# Patient Record
Sex: MALE | Race: BLACK OR AFRICAN AMERICAN | NOT HISPANIC OR LATINO | URBAN - METROPOLITAN AREA
[De-identification: names, ages, dates, MRNs, and addresses within clinical notes are randomized per-mention and may not be internally consistent; named-entity substitution may affect disease eponyms.]

---

## 2020-12-09 ENCOUNTER — OFFICE VISIT (OUTPATIENT)
Dept: URBAN - METROPOLITAN AREA CLINIC 25 | Facility: CLINIC | Age: 20
End: 2020-12-09

## 2022-11-21 ENCOUNTER — APPOINTMENT (RX ONLY)
Dept: URBAN - METROPOLITAN AREA CLINIC 12 | Facility: CLINIC | Age: 22
Setting detail: DERMATOLOGY
End: 2022-11-21

## 2022-11-21 DIAGNOSIS — L91.0 HYPERTROPHIC SCAR: ICD-10-CM

## 2022-11-21 PROCEDURE — ? COUNSELING - KELOID

## 2022-11-21 PROCEDURE — ? PATIENT SPECIFIC COUNSELING

## 2022-11-21 PROCEDURE — ? OTHER (COSMETIC)

## 2022-11-21 ASSESSMENT — LOCATION DETAILED DESCRIPTION DERM: LOCATION DETAILED: STERNUM

## 2022-11-21 ASSESSMENT — LOCATION ZONE DERM: LOCATION ZONE: TRUNK

## 2022-11-21 ASSESSMENT — LOCATION SIMPLE DESCRIPTION DERM: LOCATION SIMPLE: CHEST

## 2022-11-21 NOTE — PROCEDURE: PATIENT SPECIFIC COUNSELING
Other (Free Text): Patient presents to clinic today with his  for evaluation of keloid located on sternum. Patient states that the keloid has been growing in the past 3 years due to previous injury from playing soccer. \\n\\Randy Pitt examined the keloid and discussed patient’s case with Dr. Dusty Zambrano. Dr. Pitt was informed that the patient was seen in July. Dr. Zambrano confirmed that if surgery is performed by , Dr. Zambrano will proceed with radiation post surgery to ensure that the keloid does not return. Patient was advised that the location of the keloid is a high risk area making the chances of the keloid returning much higher. Dr. Pitt voiced that surgery should be the last option for the patient. Advised patient that he should consider getting treated with Kenalog injection atleast once a month for 3-6 months to make the keloid softer and less itchy. Dr. Pitt advised that it is important to ensure that there is social support prior to proceeding with the following treatment plan. Patient verbalized agreement and understanding. Patient  was also made aware of the treatment plan and voiced that they would like to review the quotes prior to proceeding with Kenalog injection treatment.
Detail Level: Simple

## 2023-01-13 ENCOUNTER — APPOINTMENT (RX ONLY)
Dept: URBAN - METROPOLITAN AREA CLINIC 12 | Facility: CLINIC | Age: 23
Setting detail: DERMATOLOGY
End: 2023-01-13

## 2023-01-13 DIAGNOSIS — L91.0 HYPERTROPHIC SCAR: ICD-10-CM

## 2023-01-13 PROCEDURE — ? PATIENT SPECIFIC COUNSELING

## 2023-01-13 PROCEDURE — ? INTRALESIONAL KENALOG (COSMETIC)

## 2023-01-13 PROCEDURE — ? COUNSELING - KELOID

## 2023-01-13 ASSESSMENT — LOCATION ZONE DERM
LOCATION ZONE: ARM
LOCATION ZONE: TRUNK
LOCATION ZONE: ARM
LOCATION ZONE: TRUNK

## 2023-01-13 ASSESSMENT — LOCATION DETAILED DESCRIPTION DERM
LOCATION DETAILED: LEFT LATERAL ABDOMEN
LOCATION DETAILED: STERNUM
LOCATION DETAILED: RIGHT PROXIMAL DORSAL FOREARM
LOCATION DETAILED: LEFT LATERAL ABDOMEN
LOCATION DETAILED: RIGHT DISTAL DORSAL FOREARM
LOCATION DETAILED: RIGHT MEDIAL SUPERIOR CHEST
LOCATION DETAILED: STERNUM
LOCATION DETAILED: RIGHT PROXIMAL DORSAL FOREARM

## 2023-01-13 ASSESSMENT — LOCATION SIMPLE DESCRIPTION DERM
LOCATION SIMPLE: CHEST
LOCATION SIMPLE: ABDOMEN
LOCATION SIMPLE: CHEST
LOCATION SIMPLE: ABDOMEN
LOCATION SIMPLE: RIGHT FOREARM
LOCATION SIMPLE: RIGHT FOREARM

## 2023-01-13 NOTE — PROCEDURE: PATIENT SPECIFIC COUNSELING
Other (Free Text): Patient presents for steroid treatment on his chest abdomen and back. Dr. Pitt discussed surgery and treated the keloids on his  abdomen and arm. Patient was treated with Kenalog 40 1:1 5FU using the derma jet. Patient did not tolerate the derma-jet too well. Estrellita stepped in to discuss his cash quote for the keloid removal on his chest.
Detail Level: Simple

## 2023-01-13 NOTE — PROCEDURE: INTRALESIONAL KENALOG (COSMETIC)
Kenalog Preparation: kenalog with 5-fluorouracil
Consent: The risks of atrophy were reviewed with the patient.
Price (Use Numbers Only, No Special Characters Or $): 395.00
Concentration (Mg/Ml): 20
Total Volume (Ccs): 4
Expiration Date (Optional): 2/2023
Detail Level: Detailed
Lot # (Optional): [id number]

## 2023-02-13 ENCOUNTER — APPOINTMENT (RX ONLY)
Dept: URBAN - METROPOLITAN AREA CLINIC 12 | Facility: CLINIC | Age: 23
Setting detail: DERMATOLOGY
End: 2023-02-13

## 2023-02-13 DIAGNOSIS — L91.0 HYPERTROPHIC SCAR: ICD-10-CM

## 2023-02-13 PROCEDURE — ? COUNSELING - KELOID

## 2023-02-13 PROCEDURE — ? INTRALESIONAL KENALOG (COSMETIC)

## 2023-02-13 PROCEDURE — ? PATIENT SPECIFIC COUNSELING

## 2023-02-13 ASSESSMENT — LOCATION DETAILED DESCRIPTION DERM
LOCATION DETAILED: LEFT LATERAL ABDOMEN
LOCATION DETAILED: RIGHT DISTAL DORSAL FOREARM
LOCATION DETAILED: RIGHT PROXIMAL DORSAL FOREARM
LOCATION DETAILED: RIGHT MEDIAL SUPERIOR CHEST
LOCATION DETAILED: LEFT LATERAL ABDOMEN
LOCATION DETAILED: STERNUM
LOCATION DETAILED: STERNUM
LOCATION DETAILED: RIGHT PROXIMAL DORSAL FOREARM

## 2023-02-13 ASSESSMENT — LOCATION SIMPLE DESCRIPTION DERM
LOCATION SIMPLE: ABDOMEN
LOCATION SIMPLE: RIGHT FOREARM
LOCATION SIMPLE: ABDOMEN
LOCATION SIMPLE: RIGHT FOREARM
LOCATION SIMPLE: CHEST
LOCATION SIMPLE: CHEST

## 2023-02-13 ASSESSMENT — LOCATION ZONE DERM
LOCATION ZONE: ARM
LOCATION ZONE: TRUNK
LOCATION ZONE: ARM
LOCATION ZONE: TRUNK

## 2023-02-13 NOTE — PROCEDURE: INTRALESIONAL KENALOG (COSMETIC)
Kenalog Preparation: kenalog with 5-fluorouracil
Consent: The risks of atrophy were reviewed with the patient.
Price (Use Numbers Only, No Special Characters Or $): 395.00
Concentration (Mg/Ml): 20
Total Volume (Ccs): 4
Expiration Date (Optional): 2/2023
Detail Level: Detailed
Lot # (Optional): [id number]

## 2023-03-13 ENCOUNTER — APPOINTMENT (RX ONLY)
Dept: URBAN - METROPOLITAN AREA CLINIC 12 | Facility: CLINIC | Age: 23
Setting detail: DERMATOLOGY
End: 2023-03-13

## 2023-03-13 DIAGNOSIS — L91.0 HYPERTROPHIC SCAR: ICD-10-CM

## 2023-03-13 PROCEDURE — ? COUNSELING - KELOID

## 2023-03-13 PROCEDURE — ? PATIENT SPECIFIC COUNSELING

## 2023-03-13 PROCEDURE — ? INTRALESIONAL KENALOG (COSMETIC)

## 2023-03-13 ASSESSMENT — LOCATION DETAILED DESCRIPTION DERM
LOCATION DETAILED: RIGHT DISTAL DORSAL FOREARM
LOCATION DETAILED: RIGHT MEDIAL SUPERIOR CHEST
LOCATION DETAILED: LEFT LATERAL ABDOMEN
LOCATION DETAILED: STERNUM
LOCATION DETAILED: RIGHT PROXIMAL DORSAL FOREARM
LOCATION DETAILED: RIGHT PROXIMAL DORSAL FOREARM
LOCATION DETAILED: LEFT LATERAL ABDOMEN
LOCATION DETAILED: STERNUM

## 2023-03-13 ASSESSMENT — LOCATION SIMPLE DESCRIPTION DERM
LOCATION SIMPLE: ABDOMEN
LOCATION SIMPLE: RIGHT FOREARM
LOCATION SIMPLE: CHEST
LOCATION SIMPLE: RIGHT FOREARM
LOCATION SIMPLE: CHEST
LOCATION SIMPLE: ABDOMEN

## 2023-03-13 ASSESSMENT — LOCATION ZONE DERM
LOCATION ZONE: ARM
LOCATION ZONE: TRUNK
LOCATION ZONE: TRUNK
LOCATION ZONE: ARM

## 2023-03-13 NOTE — PROCEDURE: PATIENT SPECIFIC COUNSELING
Other (Free Text): Patient presents for f/u steroid treatment on his chest abdomen and back. He reports still having some mild pain, irritation and discomfort. He voices no other concerns at the time of his visit. \\n\\Randy Pitt examined the patient and voiced doing another Kenalog injection on his abdomen and arm. Patient was treated with Kenalog 40 1:1 5FU patient tolerated very well. Patient and  were also informed that we are still waiting for insurance approval for his surgery will keep in contact patient verbalized understanding. Return in one month.
Detail Level: Simple

## 2023-03-13 NOTE — PROCEDURE: INTRALESIONAL KENALOG (COSMETIC)
Kenalog Preparation: kenalog with 5-fluorouracil
Consent: The risks of atrophy were reviewed with the patient.
Price (Use Numbers Only, No Special Characters Or $): 395.00
Concentration (Mg/Ml): 20
Administered By (Optional): dr. Pitt
Total Volume (Ccs): 4
Detail Level: Detailed
Total Volume Injected (Ccs): 1

## 2023-04-21 ENCOUNTER — APPOINTMENT (RX ONLY)
Dept: URBAN - METROPOLITAN AREA CLINIC 12 | Facility: CLINIC | Age: 23
Setting detail: DERMATOLOGY
End: 2023-04-21

## 2023-04-21 DIAGNOSIS — L91.0 HYPERTROPHIC SCAR: ICD-10-CM

## 2023-04-21 PROCEDURE — 99212 OFFICE O/P EST SF 10 MIN: CPT

## 2023-04-21 PROCEDURE — ? PRE-OP WORKLIST

## 2023-04-21 PROCEDURE — ? COUNSELING - KELOID

## 2023-04-21 ASSESSMENT — LOCATION SIMPLE DESCRIPTION DERM
LOCATION SIMPLE: RIGHT FOREARM
LOCATION SIMPLE: CHEST
LOCATION SIMPLE: CHEST
LOCATION SIMPLE: ABDOMEN

## 2023-04-21 ASSESSMENT — LOCATION DETAILED DESCRIPTION DERM
LOCATION DETAILED: STERNUM
LOCATION DETAILED: LEFT LATERAL ABDOMEN
LOCATION DETAILED: RIGHT PROXIMAL DORSAL FOREARM
LOCATION DETAILED: LEFT MEDIAL INFERIOR CHEST

## 2023-04-21 ASSESSMENT — LOCATION ZONE DERM
LOCATION ZONE: ARM
LOCATION ZONE: TRUNK
LOCATION ZONE: TRUNK

## 2023-04-21 NOTE — PROCEDURE: PRE-OP WORKLIST
Surgery Scheduled: Keloid excision
Photos Taken?: yes
Detail Level: Simple
Date Of Surgery: 3/15/23
Admission Status: 23 hour observation
Surgeon: Dr. Pitt

## 2023-05-22 ENCOUNTER — APPOINTMENT (RX ONLY)
Dept: URBAN - METROPOLITAN AREA CLINIC 12 | Facility: CLINIC | Age: 23
Setting detail: DERMATOLOGY
End: 2023-05-22

## 2023-05-22 DIAGNOSIS — Z48.89 ENCOUNTER FOR OTHER SPECIFIED SURGICAL AFTERCARE: ICD-10-CM

## 2023-05-22 ASSESSMENT — LOCATION SIMPLE DESCRIPTION DERM: LOCATION SIMPLE: ABDOMEN

## 2023-05-22 ASSESSMENT — LOCATION ZONE DERM: LOCATION ZONE: TRUNK

## 2023-05-22 ASSESSMENT — LOCATION DETAILED DESCRIPTION DERM: LOCATION DETAILED: LEFT RIB CAGE

## 2023-05-30 ENCOUNTER — APPOINTMENT (RX ONLY)
Dept: URBAN - METROPOLITAN AREA CLINIC 12 | Facility: CLINIC | Age: 23
Setting detail: DERMATOLOGY
End: 2023-05-30

## 2023-05-30 DIAGNOSIS — Z48.89 ENCOUNTER FOR OTHER SPECIFIED SURGICAL AFTERCARE: ICD-10-CM

## 2023-05-30 PROCEDURE — 99024 POSTOP FOLLOW-UP VISIT: CPT

## 2023-05-30 PROCEDURE — ? POST-OP CHECK

## 2023-05-30 PROCEDURE — ? PATIENT SPECIFIC COUNSELING

## 2023-05-30 PROCEDURE — ? COUNSELING - POST-OP CHECK

## 2023-05-30 ASSESSMENT — LOCATION SIMPLE DESCRIPTION DERM
LOCATION SIMPLE: CHEST
LOCATION SIMPLE: CHEST

## 2023-05-30 ASSESSMENT — LOCATION DETAILED DESCRIPTION DERM
LOCATION DETAILED: LEFT MEDIAL SUPERIOR CHEST
LOCATION DETAILED: STERNUM
LOCATION DETAILED: STERNUM

## 2023-05-30 ASSESSMENT — LOCATION ZONE DERM
LOCATION ZONE: TRUNK
LOCATION ZONE: TRUNK

## 2023-05-30 NOTE — PROCEDURE: PATIENT SPECIFIC COUNSELING
Detail Level: Zone
Other (Free Text): Patient presents s/p keloid removal from his chest. Patient reports doing well, no concerns with healing. \\n\\Randy Pitt voiced his incision looks great and is healing well. No signs of infection hematoma or seroma. Dr. Pitt advised patient to start applying Silagen to his scar to help keep the scar nice and flat. Patient may return to work, but with limitations

## 2023-05-30 NOTE — PROCEDURE: POST-OP CHECK
Detail Level: Detailed
Add Postop Global No-Charge Code (57941)?: yes
Wound Evaluated By: Dr. Allen Soria

## 2023-06-13 ENCOUNTER — APPOINTMENT (RX ONLY)
Dept: URBAN - METROPOLITAN AREA CLINIC 12 | Facility: CLINIC | Age: 23
Setting detail: DERMATOLOGY
End: 2023-06-13

## 2023-06-13 DIAGNOSIS — Z48.89 ENCOUNTER FOR OTHER SPECIFIED SURGICAL AFTERCARE: ICD-10-CM

## 2023-06-13 PROCEDURE — ? PATIENT SPECIFIC COUNSELING

## 2023-06-13 PROCEDURE — 99024 POSTOP FOLLOW-UP VISIT: CPT

## 2023-06-13 PROCEDURE — ? COUNSELING - POST-OP CHECK

## 2023-06-13 PROCEDURE — ? POST-OP CHECK

## 2023-06-13 ASSESSMENT — LOCATION ZONE DERM
LOCATION ZONE: TRUNK
LOCATION ZONE: TRUNK

## 2023-06-13 ASSESSMENT — LOCATION SIMPLE DESCRIPTION DERM
LOCATION SIMPLE: CHEST
LOCATION SIMPLE: CHEST

## 2023-06-13 ASSESSMENT — LOCATION DETAILED DESCRIPTION DERM
LOCATION DETAILED: STERNUM
LOCATION DETAILED: LEFT MEDIAL SUPERIOR CHEST
LOCATION DETAILED: STERNUM

## 2023-06-13 NOTE — PROCEDURE: POST-OP CHECK
Detail Level: Detailed
Add Postop Global No-Charge Code (20300)?: yes
Wound Evaluated By: Dr. Allen Soria

## 2023-06-13 NOTE — PROCEDURE: PATIENT SPECIFIC COUNSELING
Detail Level: Zone
Other (Free Text): Patient presents s/p keloid removal from his chest. Patient reports doing well, no concerns with healing. \\n\\Randy Pitt voiced his incision looks great and is healing well. No signs of infection hematoma or seroma. Dr. Pitt advised patient to keep applying Silagen to his scar to help keep the scar nice and flat, the scar will clear up in a year or two. Dr. Pitt Patient may return to work. Patient was asked to be back in two weeks.

## 2023-07-18 ENCOUNTER — APPOINTMENT (RX ONLY)
Dept: URBAN - METROPOLITAN AREA CLINIC 12 | Facility: CLINIC | Age: 23
Setting detail: DERMATOLOGY
End: 2023-07-18

## 2023-07-18 DIAGNOSIS — Z48.89 ENCOUNTER FOR OTHER SPECIFIED SURGICAL AFTERCARE: ICD-10-CM

## 2023-07-18 PROCEDURE — ? POST-OP CHECK

## 2023-07-18 PROCEDURE — ? PATIENT SPECIFIC COUNSELING

## 2023-07-18 PROCEDURE — ? COUNSELING - POST-OP CHECK

## 2023-07-18 PROCEDURE — 99024 POSTOP FOLLOW-UP VISIT: CPT

## 2023-07-18 ASSESSMENT — LOCATION ZONE DERM
LOCATION ZONE: TRUNK
LOCATION ZONE: TRUNK

## 2023-07-18 ASSESSMENT — LOCATION SIMPLE DESCRIPTION DERM
LOCATION SIMPLE: CHEST
LOCATION SIMPLE: CHEST

## 2023-07-18 NOTE — PROCEDURE: PATIENT SPECIFIC COUNSELING
Detail Level: Zone
Other (Free Text): Patient presents s/p keloid removal from his chest. Patient reports doing well, no concerns with healing. \\n\\Randy Pitt voiced his incision looks great and is healing well. No signs of infection hematoma or seroma. Dr. Pitt advised patient to keep applying Silagen to his scar to help keep the scar nice and flat, the scar will clear up in a year or two. No evidence of keloid coming back, patient must keep an eye out for it growing back.  advised the patient to wear tape on the chest to help keep the scar moist . The patient was asked to return to the clinic in 2 month .

## 2023-07-18 NOTE — PROCEDURE: POST-OP CHECK
Detail Level: Detailed
Add Postop Global No-Charge Code (39058)?: yes
Wound Evaluated By: Dr. Allen Soria